# Patient Record
Sex: FEMALE | Race: WHITE | NOT HISPANIC OR LATINO | RURAL
[De-identification: names, ages, dates, MRNs, and addresses within clinical notes are randomized per-mention and may not be internally consistent; named-entity substitution may affect disease eponyms.]

---

## 2024-03-26 ENCOUNTER — LAB (OUTPATIENT)
Dept: RURAL CLINIC 2 | Facility: CLINIC | Age: 79
End: 2024-03-26

## 2024-03-26 ENCOUNTER — OV NP (OUTPATIENT)
Dept: RURAL CLINIC 2 | Facility: CLINIC | Age: 79
End: 2024-03-26
Payer: COMMERCIAL

## 2024-03-26 VITALS
WEIGHT: 142 LBS | TEMPERATURE: 98 F | SYSTOLIC BLOOD PRESSURE: 118 MMHG | HEIGHT: 65 IN | BODY MASS INDEX: 23.66 KG/M2 | HEART RATE: 65 BPM | DIASTOLIC BLOOD PRESSURE: 82 MMHG

## 2024-03-26 DIAGNOSIS — R19.5 PALE STOOL: ICD-10-CM

## 2024-03-26 DIAGNOSIS — R09.A2 GLOBUS SENSATION: ICD-10-CM

## 2024-03-26 DIAGNOSIS — R14.0 ABDOMINAL BLOATING: ICD-10-CM

## 2024-03-26 DIAGNOSIS — K21.9 GASTROESOPHAGEAL REFLUX DISEASE, UNSPECIFIED WHETHER ESOPHAGITIS PRESENT: ICD-10-CM

## 2024-03-26 PROBLEM — 116289008: Status: ACTIVE | Noted: 2024-03-26

## 2024-03-26 PROBLEM — 267056008: Status: ACTIVE | Noted: 2024-03-26

## 2024-03-26 PROBLEM — 267103008: Status: ACTIVE | Noted: 2024-03-26

## 2024-03-26 PROBLEM — 235595009: Status: ACTIVE | Noted: 2024-03-26

## 2024-03-26 PROCEDURE — 99203 OFFICE O/P NEW LOW 30 MIN: CPT | Performed by: NURSE PRACTITIONER

## 2024-03-26 RX ORDER — ASPIRIN 81 MG/1
1 TABLET TABLET, CHEWABLE ORAL ONCE A DAY
Status: ACTIVE | COMMUNITY

## 2024-03-26 RX ORDER — GLUCOSAMINE/D3/BOSWELLIA SERRA 1500MG-400
AS DIRECTED TABLET ORAL
Status: ACTIVE | COMMUNITY

## 2024-03-26 RX ORDER — EZETIMIBE 10 MG/1
1 TABLET TABLET ORAL ONCE A DAY
Status: ACTIVE | COMMUNITY

## 2024-03-26 RX ORDER — MULTIVITAMIN
1 TABLET TABLET ORAL ONCE A DAY
Status: ACTIVE | COMMUNITY

## 2024-03-26 RX ORDER — SERTRALINE 100 MG/1
1 TABLET TABLET, FILM COATED ORAL ONCE A DAY
Status: ACTIVE | COMMUNITY

## 2024-03-26 RX ORDER — ROSUVASTATIN CALCIUM 40 MG/1
1 TABLET TABLET, COATED ORAL ONCE A DAY
Status: ACTIVE | COMMUNITY

## 2024-03-26 RX ORDER — FAMOTIDINE 20 MG/1
1 TABLET AT BEDTIME AS NEEDED TABLET, FILM COATED ORAL ONCE A DAY
Status: ACTIVE | COMMUNITY

## 2024-03-26 NOTE — HPI-ZZZTODAY'S VISIT
The patient is a 78-year-old female self-referred for a history of chronic GERD with breakthrough acid reflux despite taking acid reducers twice daily.  She occasionally has to swallow several times in order to pass food or liquids through.  She denies choking.  Over the past month the color of her bowel movements have been pale and has not changed her diet.  The bowel movement itself is normal, formed, no rectal bleeding or abdominal pain.  She has had intermittent abdominal bloating.  Her last colonoscopy was completed this past November with unremarkable results per patient.  In August 2023 she underwent a CT scan of the abdomen and pelvis for abdominal pain and brought the results with her revealing no acute intra-abdominal abnormality.

## 2024-05-10 ENCOUNTER — OFFICE VISIT (OUTPATIENT)
Dept: RURAL CLINIC 8 | Facility: CLINIC | Age: 79
End: 2024-05-10

## 2024-05-22 ENCOUNTER — OFFICE VISIT (OUTPATIENT)
Dept: RURAL MEDICAL CENTER 4 | Facility: MEDICAL CENTER | Age: 79
End: 2024-05-22

## 2024-05-22 ENCOUNTER — CLAIMS CREATED FROM THE CLAIM WINDOW (OUTPATIENT)
Dept: RURAL MEDICAL CENTER 4 | Facility: MEDICAL CENTER | Age: 79
End: 2024-05-22

## 2024-05-22 DIAGNOSIS — K25.9 GASTRIC ULCER WITHOUT HEMORRHAGE OR PERFORATION, UNSPECIFIED CHRONICITY: ICD-10-CM

## 2024-05-22 PROBLEM — 73481001: Status: ACTIVE | Noted: 2024-05-22

## 2024-05-22 RX ORDER — EZETIMIBE 10 MG/1
1 TABLET TABLET ORAL ONCE A DAY
Status: ACTIVE | COMMUNITY

## 2024-05-22 RX ORDER — PANTOPRAZOLE SODIUM 20 MG/1
1 TABLET TABLET, DELAYED RELEASE ORAL ONCE A DAY
Qty: 90 TABLET | Refills: 3 | OUTPATIENT
Start: 2024-05-22

## 2024-05-22 RX ORDER — MULTIVITAMIN
1 TABLET TABLET ORAL ONCE A DAY
Status: ACTIVE | COMMUNITY

## 2024-05-22 RX ORDER — SERTRALINE 100 MG/1
1 TABLET TABLET, FILM COATED ORAL ONCE A DAY
Status: ACTIVE | COMMUNITY

## 2024-05-22 RX ORDER — ROSUVASTATIN CALCIUM 40 MG/1
1 TABLET TABLET, COATED ORAL ONCE A DAY
Status: ACTIVE | COMMUNITY

## 2024-05-22 RX ORDER — GLUCOSAMINE/D3/BOSWELLIA SERRA 1500MG-400
AS DIRECTED TABLET ORAL
Status: ACTIVE | COMMUNITY

## 2024-05-22 RX ORDER — ASPIRIN 81 MG/1
1 TABLET TABLET, CHEWABLE ORAL ONCE A DAY
Status: ACTIVE | COMMUNITY

## 2024-05-22 RX ORDER — FAMOTIDINE 20 MG/1
1 TABLET AT BEDTIME AS NEEDED TABLET, FILM COATED ORAL ONCE A DAY
Status: ACTIVE | COMMUNITY

## 2025-05-12 ENCOUNTER — ERX REFILL RESPONSE (OUTPATIENT)
Dept: RURAL CLINIC 2 | Facility: CLINIC | Age: 80
End: 2025-05-12

## 2025-05-12 RX ORDER — PANTOPRAZOLE SODIUM 20 MG/1
1 TABLET TABLET, DELAYED RELEASE ORAL ONCE A DAY
Qty: 90 TABLET | Refills: 0

## 2025-08-17 ENCOUNTER — ERX REFILL RESPONSE (OUTPATIENT)
Dept: RURAL CLINIC 2 | Facility: CLINIC | Age: 80
End: 2025-08-17

## 2025-08-17 RX ORDER — PANTOPRAZOLE SODIUM 20 MG/1
TAKE 1 TABLET BY MOUTH EVERY DAY TABLET, DELAYED RELEASE ORAL
Qty: 90 TABLET | Refills: 0 | OUTPATIENT

## 2025-08-17 RX ORDER — PANTOPRAZOLE SODIUM 20 MG/1
1 TABLET TABLET, DELAYED RELEASE ORAL ONCE A DAY
Qty: 90 TABLET | Refills: 0 | OUTPATIENT